# Patient Record
Sex: FEMALE | URBAN - METROPOLITAN AREA
[De-identification: names, ages, dates, MRNs, and addresses within clinical notes are randomized per-mention and may not be internally consistent; named-entity substitution may affect disease eponyms.]

---

## 2017-03-14 ENCOUNTER — APPOINTMENT (RX ONLY)
Dept: URBAN - METROPOLITAN AREA TELEMEDICINE 2 | Facility: TELEMEDICINE | Age: 42
Setting detail: DERMATOLOGY
End: 2017-03-14

## 2017-03-14 DIAGNOSIS — Z41.9 ENCOUNTER FOR PROCEDURE FOR PURPOSES OTHER THAN REMEDYING HEALTH STATE, UNSPECIFIED: ICD-10-CM

## 2017-03-14 PROCEDURE — ? FACIAL

## 2017-03-14 ASSESSMENT — LOCATION SIMPLE DESCRIPTION DERM: LOCATION SIMPLE: LEFT CHEEK

## 2017-03-14 ASSESSMENT — LOCATION ZONE DERM: LOCATION ZONE: FACE

## 2017-03-14 ASSESSMENT — LOCATION DETAILED DESCRIPTION DERM: LOCATION DETAILED: LEFT INFERIOR MEDIAL MALAR CHEEK

## 2017-03-14 NOTE — HPI: FACE (AGING FACE)
How Severe Is It?: moderate
Is This A New Presentation, Or A Follow-Up?: Aging Face
Additional History: No autoimmune, hepatitis, diabetes, pregnancy, HIV. Does HSV.

## 2017-03-14 NOTE — PROCEDURE: FACIAL
Facial Steaming: steamed
Exfoliation Type: enzyme
Detail Level: Zone
Extraction Method: sterile needle
Mask Type (Optional): cream based
Treatment Type (Optional): Spa Facial
Comments (Sticky): Pumpkin cleanser-steam/hot towel, pumpkin mask + 20% lactic -steam/hot towel, extractions, LHA solution, phyto gel, growth factor, 12 flower mask, cold towel, mandelic spot treat, metacell, elta clear. Gave samples of elta clear, tinted. Recommended Josef Rouse Rowell powder sunscreen for touch up that won't burn (sensitive to most), bare minerals matte formula-ordering her mineral makeup from [place] (see consult form). \\n\\n* Moved here from [place] with her , 2 dogs, 2 cats.  in [place]- majored in marine biology.